# Patient Record
Sex: MALE | Race: AMERICAN INDIAN OR ALASKA NATIVE
[De-identification: names, ages, dates, MRNs, and addresses within clinical notes are randomized per-mention and may not be internally consistent; named-entity substitution may affect disease eponyms.]

---

## 2019-03-04 ENCOUNTER — HOSPITAL ENCOUNTER (EMERGENCY)
Dept: HOSPITAL 5 - ED | Age: 24
Discharge: HOME | End: 2019-03-04
Payer: COMMERCIAL

## 2019-03-04 PROCEDURE — 99282 EMERGENCY DEPT VISIT SF MDM: CPT

## 2019-03-04 NOTE — EMERGENCY DEPARTMENT REPORT
ED Rash HPI





- HPI


Chief Complaint: Skin Rash


Stated Complaint: BACK PAIN


Time Seen by Provider: 03/04/19 11:47


Duration: 3 Days


Rash Symptoms: Yes Itching, No Facial Swelling, No Tongue/Oral Swelling, No 

Breathing Difficulties, No Choking Sensation, No Wheezing/Dyspnea, No Peeling, 

No Blistering, No Fever, No Lightheaded, No Malaise, No Myalgias


Severity: mild


Other History: Patient is a 23-year-old male who presents with a rash to his 

penis.  This actually just a red excoriated area there is no discrete lesion.  

Patient denies dysuria.  He denies any discharge.  He is sexually active with 

one female.





ED Review of Systems


ROS: 


Stated complaint: BACK PAIN


Other details as noted in HPI





Comment: All other systems reviewed and negative


Constitutional: denies: chills


Eyes: denies: eye pain


ENT: denies: ear pain


Respiratory: denies: orthopnea


Cardiovascular: denies: dyspnea on exertion


Endocrine: denies: excessive sweating


Gastrointestinal: denies: nausea, vomiting


Genitourinary: denies: urgency, dysuria


Musculoskeletal: denies: back pain


Skin: as per HPI, rash


Neurological: denies: headache, weakness


Psychiatric: denies: depression





ED Past Medical Hx





- Past Medical History


Previous Medical History?: No





- Surgical History


Past Surgical History?: Yes


Additional Surgical History: undescended testicle





- Social History


Smoking Status: Never Smoker


Substance Use Type: Marijuana





Rash Exam





- Exam


General: 


Vital signs noted. No distress. Alert and acting appropriately.





HEENT: No Periorbital Edema, No Conjuctival Injection, No Chemosis, No Perioral 

Edema, No Tongue Edema, No Uvular Edema, No Compromised Airway, No Drooling


Lungs: Yes Good Air Exchange, No Wheezes, No Ronchi, No Stridor, No Cough, No 

Labored Respirations, No Retractions, No Use of Accessory Muscles, No Other 

Abnormal Lung Sounds


Heart: Yes Regular, No Murmur


Skin: Yes Other (DRY SKIN, IRRITATION ON DISTAL PENIS ONLY), No Urticarial Rash


Other: Positive: Abdomen Normal, Neurologic Normal, Musculoskeletal Normal





ED Course


                                   Vital Signs











  03/04/19





  11:47


 


Temperature 97.9 F


 


Pulse Rate 66


 


Respiratory 18





Rate 


 


Blood Pressure 138/81


 


O2 Sat by Pulse 100





Oximetry 














ED Medical Decision Making





- Medical Decision Making





RASH


NO DYSURIA


NO DISCHARGE





Critical care attestation.: 


If time is entered above; I have spent that time in minutes in the direct care 

of this critically ill patient, excluding procedure time.








ED Disposition


Clinical Impression: 


 Rash





Disposition: DC-01 TO HOME OR SELFCARE


Is pt being admited?: No


Does the pt Need Aspirin: No


Condition: Stable


Instructions:  Acute Rash (ED)


Additional Instructions: 


TREAT AS WE DISCUSSED





IF PERSISTS FOLLOW UP WITH DERMATOLOGY MD


REFERRAL BELOW


Referrals: 


[facility] [Other] - 3-5 Days


DOMITILA LE MD [Referring] - 3-5 Days


Time of Disposition: 13:44

## 2019-03-04 NOTE — EMERGENCY DEPARTMENT REPORT
Chief Complaint: Skin Rash


Stated Complaint: BACK PAIN


Time Seen by Provider: 03/04/19 11:47





- HPI


History of Present Illness: 





Pt c/o rash for the last two days 


he states it has been itching, dry, and redness 


no penile discharge, no bumps 


OTC antifungals has improved itching and irritation 


(+) sexually active, does not use protection 








MSE complete 





MSE screening note: 


Focused history and physical exam performed.














ED Disposition for MSE


Condition: Stable